# Patient Record
Sex: MALE | Race: WHITE | NOT HISPANIC OR LATINO
[De-identification: names, ages, dates, MRNs, and addresses within clinical notes are randomized per-mention and may not be internally consistent; named-entity substitution may affect disease eponyms.]

---

## 2022-04-13 PROBLEM — Z00.00 ENCOUNTER FOR PREVENTIVE HEALTH EXAMINATION: Status: ACTIVE | Noted: 2022-04-13

## 2022-04-19 ENCOUNTER — NON-APPOINTMENT (OUTPATIENT)
Age: 57
End: 2022-04-19

## 2022-04-19 ENCOUNTER — APPOINTMENT (OUTPATIENT)
Dept: PULMONOLOGY | Facility: CLINIC | Age: 57
End: 2022-04-19
Payer: COMMERCIAL

## 2022-04-19 VITALS
HEIGHT: 72 IN | TEMPERATURE: 96.5 F | DIASTOLIC BLOOD PRESSURE: 84 MMHG | BODY MASS INDEX: 31.83 KG/M2 | OXYGEN SATURATION: 97 % | HEART RATE: 80 BPM | SYSTOLIC BLOOD PRESSURE: 127 MMHG | WEIGHT: 235 LBS

## 2022-04-19 DIAGNOSIS — F41.9 ANXIETY DISORDER, UNSPECIFIED: ICD-10-CM

## 2022-04-19 PROCEDURE — 99204 OFFICE O/P NEW MOD 45 MIN: CPT

## 2022-04-19 PROCEDURE — 99072 ADDL SUPL MATRL&STAF TM PHE: CPT

## 2022-04-19 RX ORDER — BUPROPION HYDROCHLORIDE 150 MG/1
150 TABLET, EXTENDED RELEASE ORAL
Refills: 0 | Status: ACTIVE | COMMUNITY
Start: 2022-04-19

## 2022-04-19 NOTE — REVIEW OF SYSTEMS
[Fatigue] : fatigue [Cough] : cough [Headache] : headache [Anxiety] : anxiety [Negative] : Dermatologic [Dyspnea] : no dyspnea [Wheezing] : no wheezing [SOB on Exertion] : no sob on exertion [Seasonal Allergies] : no seasonal allergies [GERD] : no gerd [Diarrhea] : no diarrhea [Constipation] : no constipation [Dysphagia] : no dysphagia [Hepatic Disease] : no hepatic disease [Dizziness] : no dizziness [Numbness] : no numbness [Memory Loss] : no memory loss [Tremor] : no tremor [Depression] : no depression [Panic Attacks] : no panic attacks [Diabetes] : no diabetes [Thyroid Problem] : no thyroid problem [Obesity] : no obesity [TextBox_14] : deviated spetum [TextBox_44] : left bundle block [TextBox_91] : herniated disc inhis neck

## 2022-04-19 NOTE — REASON FOR VISIT
[Initial] : an initial visit [Cough] : cough [Shortness of Breath] : shortness of breath [TextBox_44] : dry cough, Interested in a home sleep study.

## 2022-04-19 NOTE — HISTORY OF PRESENT ILLNESS
[Never] : never [TextBox_4] : 4/19/22 the patient has a dry cough never smoker   Chiropractor ,pain management

## 2022-05-31 ENCOUNTER — APPOINTMENT (OUTPATIENT)
Dept: PULMONOLOGY | Facility: CLINIC | Age: 57
End: 2022-05-31
Payer: COMMERCIAL

## 2022-05-31 VITALS
OXYGEN SATURATION: 93 % | HEART RATE: 84 BPM | HEIGHT: 72 IN | DIASTOLIC BLOOD PRESSURE: 90 MMHG | TEMPERATURE: 97.3 F | WEIGHT: 235 LBS | BODY MASS INDEX: 31.83 KG/M2 | SYSTOLIC BLOOD PRESSURE: 130 MMHG

## 2022-05-31 DIAGNOSIS — G47.33 OBSTRUCTIVE SLEEP APNEA (ADULT) (PEDIATRIC): ICD-10-CM

## 2022-05-31 DIAGNOSIS — R05.3 CHRONIC COUGH: ICD-10-CM

## 2022-05-31 PROCEDURE — 99213 OFFICE O/P EST LOW 20 MIN: CPT

## 2022-05-31 PROCEDURE — 99072 ADDL SUPL MATRL&STAF TM PHE: CPT

## 2022-05-31 RX ORDER — ESCITALOPRAM OXALATE 20 MG/1
20 TABLET ORAL
Qty: 30 | Refills: 0 | Status: ACTIVE | COMMUNITY
Start: 2021-06-04

## 2022-05-31 RX ORDER — BUPROPION HYDROCHLORIDE 300 MG/1
300 TABLET, EXTENDED RELEASE ORAL
Qty: 30 | Refills: 0 | Status: ACTIVE | COMMUNITY
Start: 2021-06-04

## 2022-05-31 NOTE — ASSESSMENT
[FreeTextEntry1] : 5/31/2022 Mr. Tiwari is a 56-year-old male with a history of 1) chronic cough he attributes this to allergies.  He takes over-the-counter medications for that.  2) the patient is very concerned that he has sleep apnea and has not been able to wear the home sleep equipment as of yet.  He states he will do that and over the next couple days and return the equipment to be analyzed.  At this time the patient will continue the same plan and return in a few weeks after the results are known of the sleep study time spent 20 minutes counseling education, and physical exam/history

## 2022-05-31 NOTE — HISTORY OF PRESENT ILLNESS
[Never] : never [TextBox_4] : 4/19/22 the patient has a dry cough never smoker   Chiropractor ,pain management\par \par 5/31/2022 Mr. 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  Patient states he has a chronic long-term chronic cough but is more concerned with the possibility has sleep apnea he has been told that he snores does not feel rested when he wakes in the morning and has some hyper somnolence during the daytime.  Patient's BMI is 32.

## 2022-05-31 NOTE — PHYSICAL EXAM
[No Acute Distress] : no acute distress [Normal Appearance] : normal appearance [Normal Rate/Rhythm] : normal rate/rhythm [Normal S1, S2] : normal s1, s2 [No Resp Distress] : no resp distress [Clear to Auscultation Bilaterally] : clear to auscultation bilaterally [No Abnormalities] : no abnormalities [Benign] : benign [Normal Gait] : normal gait [No Clubbing] : no clubbing [No Cyanosis] : no cyanosis [No Edema] : no edema [No Focal Deficits] : no focal deficits [Oriented x3] : oriented x3

## 2022-05-31 NOTE — REASON FOR VISIT
[Follow-Up] : a follow-up visit [Sleep Apnea] : sleep apnea [Cough] : cough [TextBox_44] : 1 month, X ray 5/17/22

## 2022-05-31 NOTE — REVIEW OF SYSTEMS
[Fatigue] : fatigue [Cough] : cough [Dyspnea] : no dyspnea [Wheezing] : no wheezing [SOB on Exertion] : no sob on exertion [Seasonal Allergies] : no seasonal allergies [GERD] : no gerd [Diarrhea] : no diarrhea [Constipation] : no constipation [Dysphagia] : no dysphagia [Hepatic Disease] : no hepatic disease [Headache] : headache [Dizziness] : no dizziness [Numbness] : no numbness [Memory Loss] : no memory loss [Tremor] : no tremor [Depression] : no depression [Anxiety] : anxiety [Panic Attacks] : no panic attacks [Diabetes] : no diabetes [Thyroid Problem] : no thyroid problem [Obesity] : no obesity [Negative] : Dermatologic [TextBox_14] : deviated septum [TextBox_44] : left bundle block [TextBox_91] : herniated disc inhis neck

## 2022-06-21 ENCOUNTER — APPOINTMENT (OUTPATIENT)
Dept: PULMONOLOGY | Facility: CLINIC | Age: 57
End: 2022-06-21

## 2022-08-02 ENCOUNTER — APPOINTMENT (OUTPATIENT)
Dept: PULMONOLOGY | Facility: CLINIC | Age: 57
End: 2022-08-02

## 2022-08-02 VITALS
BODY MASS INDEX: 31.83 KG/M2 | HEART RATE: 78 BPM | DIASTOLIC BLOOD PRESSURE: 60 MMHG | SYSTOLIC BLOOD PRESSURE: 125 MMHG | TEMPERATURE: 96.1 F | HEIGHT: 72 IN | WEIGHT: 235 LBS | OXYGEN SATURATION: 94 %

## 2022-08-02 PROCEDURE — 99214 OFFICE O/P EST MOD 30 MIN: CPT

## 2022-08-02 PROCEDURE — 99072 ADDL SUPL MATRL&STAF TM PHE: CPT

## 2022-08-02 NOTE — HISTORY OF PRESENT ILLNESS
[Never] : never [Obstructive Sleep Apnea] : obstructive sleep apnea [CPAP:] : CPAP [TextBox_4] : 4/19/22 the patient has a dry cough never smoker   Chiropractor ,pain management\par \par 5/31/2022 Mr. 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  Patient states he has a chronic long-term chronic cough but is more concerned with the possibility has sleep apnea he has been told that he snores does not feel rested when he wakes in the morning and has some hyper somnolence during the daytime.  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  He has the chip from his machine he has a full mask and Res-med Apap machine  [TextBox_133] : 100

## 2022-08-02 NOTE — ASSESSMENT
[FreeTextEntry1] : 5/31/2022 Mr. Tiwari is a 56-year-old male with a history of 1) chronic cough he attributes this to allergies.  He takes over-the-counter medications for that.  2) the patient is very concerned that he has sleep apnea and has not been able to wear the home sleep equipment as of yet.  He states he will do that and over the next couple days and return the equipment to be analyzed.  At this time the patient will continue the same plan and return in a few weeks after the results are known of the sleep study time spent 20 minutes counseling education, and physical exam/history\par \par \par 8/2/22 The patent physically feels   Full mask and Res-Med APAP machine   no difficulty He has the chip  to review  He has found he has more alertness, less headaches, more restful sleep  and more endurance.  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

## 2023-02-07 ENCOUNTER — APPOINTMENT (OUTPATIENT)
Dept: PULMONOLOGY | Facility: CLINIC | Age: 58
End: 2023-02-07